# Patient Record
Sex: MALE | Race: WHITE | URBAN - METROPOLITAN AREA
[De-identification: names, ages, dates, MRNs, and addresses within clinical notes are randomized per-mention and may not be internally consistent; named-entity substitution may affect disease eponyms.]

---

## 2020-08-19 ENCOUNTER — HISTORICAL (OUTPATIENT)
Dept: ADMINISTRATIVE | Facility: HOSPITAL | Age: 60
End: 2020-08-19

## 2020-10-05 ENCOUNTER — HISTORICAL (OUTPATIENT)
Dept: ADMINISTRATIVE | Facility: HOSPITAL | Age: 60
End: 2020-10-05

## 2022-04-30 NOTE — H&P
Neurosurgery      Patient:   Long Fraser             MRN: [id number]            FIN: [id number]               Age:   60 years     Sex:  Male     :  1960   Associated Diagnoses:   None   Author:   Melinda BOURNE, [facility]      Health Status   The H&P was reviewed, the patient was examined, and there are no changes to the patient's condition..

## 2022-04-30 NOTE — H&P
Patient:   Long Fraser             MRN: [id number]            FIN: [id number]               Age:   60 years     Sex:  Male     :  1960   Associated Diagnoses:   None   Author:   Leonela Macdonald      Health Status   The H&P was reviewed, the patient was examined, and there are no changes to the patient's condition..